# Patient Record
Sex: MALE | Race: WHITE | NOT HISPANIC OR LATINO | Employment: PART TIME | URBAN - METROPOLITAN AREA
[De-identification: names, ages, dates, MRNs, and addresses within clinical notes are randomized per-mention and may not be internally consistent; named-entity substitution may affect disease eponyms.]

---

## 2023-11-21 PROBLEM — F84.0 AUTISTIC DISORDER, ACTIVE (HHS-HCC): Status: ACTIVE | Noted: 2023-11-21

## 2023-11-21 PROBLEM — G93.40 ENCEPHALOPATHY: Status: ACTIVE | Noted: 2023-11-21

## 2023-11-21 PROBLEM — F90.2 ADHD (ATTENTION DEFICIT HYPERACTIVITY DISORDER), COMBINED TYPE: Status: ACTIVE | Noted: 2023-11-21

## 2023-11-21 PROBLEM — F41.9 ANXIETY: Status: ACTIVE | Noted: 2023-11-21

## 2023-11-21 PROBLEM — F63.9 IMPULSE DISORDER: Status: ACTIVE | Noted: 2023-11-21

## 2023-11-21 RX ORDER — DEXTROAMPHETAMINE SACCHARATE, AMPHETAMINE ASPARTATE MONOHYDRATE, DEXTROAMPHETAMINE SULFATE AND AMPHETAMINE SULFATE 7.5; 7.5; 7.5; 7.5 MG/1; MG/1; MG/1; MG/1
CAPSULE, EXTENDED RELEASE ORAL
COMMUNITY
Start: 2019-09-27 | End: 2024-03-04 | Stop reason: SDUPTHER

## 2023-11-21 RX ORDER — FLUOXETINE HYDROCHLORIDE 20 MG/1
1 CAPSULE ORAL DAILY
COMMUNITY
Start: 2018-03-26 | End: 2023-12-11 | Stop reason: SDUPTHER

## 2023-11-21 RX ORDER — FLUOXETINE 10 MG/1
1 CAPSULE ORAL DAILY
COMMUNITY
Start: 2017-12-07 | End: 2023-12-11 | Stop reason: SDUPTHER

## 2023-11-21 RX ORDER — RISPERIDONE 1 MG/1
1 TABLET ORAL 2 TIMES DAILY
COMMUNITY
Start: 2017-06-22 | End: 2023-12-11 | Stop reason: SDUPTHER

## 2023-12-11 ENCOUNTER — OFFICE VISIT (OUTPATIENT)
Dept: NEUROLOGY | Facility: CLINIC | Age: 23
End: 2023-12-11
Payer: MEDICAID

## 2023-12-11 VITALS
HEIGHT: 67 IN | DIASTOLIC BLOOD PRESSURE: 68 MMHG | WEIGHT: 225 LBS | BODY MASS INDEX: 35.31 KG/M2 | HEART RATE: 104 BPM | SYSTOLIC BLOOD PRESSURE: 110 MMHG

## 2023-12-11 DIAGNOSIS — F41.9 ANXIETY: ICD-10-CM

## 2023-12-11 DIAGNOSIS — F84.0 AUTISM SPECTRUM DISORDER (HHS-HCC): ICD-10-CM

## 2023-12-11 DIAGNOSIS — F84.0 AUTISTIC DISORDER, ACTIVE (HHS-HCC): ICD-10-CM

## 2023-12-11 DIAGNOSIS — F90.2 ATTENTION DEFICIT HYPERACTIVITY DISORDER (ADHD), COMBINED TYPE: ICD-10-CM

## 2023-12-11 DIAGNOSIS — F63.9 IMPULSE DISORDER: ICD-10-CM

## 2023-12-11 DIAGNOSIS — G93.40 ENCEPHALOPATHY: Primary | ICD-10-CM

## 2023-12-11 PROCEDURE — 99214 OFFICE O/P EST MOD 30 MIN: CPT | Performed by: PSYCHIATRY & NEUROLOGY

## 2023-12-11 PROCEDURE — 1036F TOBACCO NON-USER: CPT | Performed by: PSYCHIATRY & NEUROLOGY

## 2023-12-11 RX ORDER — RISPERIDONE 1 MG/1
1 TABLET ORAL 2 TIMES DAILY
Qty: 60 TABLET | Refills: 3 | Status: SHIPPED | OUTPATIENT
Start: 2023-12-11 | End: 2024-05-29 | Stop reason: SDUPTHER

## 2023-12-11 RX ORDER — DEXTROAMPHETAMINE SACCHARATE, AMPHETAMINE ASPARTATE MONOHYDRATE, DEXTROAMPHETAMINE SULFATE AND AMPHETAMINE SULFATE 7.5; 7.5; 7.5; 7.5 MG/1; MG/1; MG/1; MG/1
30 CAPSULE, EXTENDED RELEASE ORAL EVERY MORNING
Qty: 30 CAPSULE | Refills: 0 | Status: SHIPPED | OUTPATIENT
Start: 2024-01-10 | End: 2024-03-04 | Stop reason: SDUPTHER

## 2023-12-11 RX ORDER — DEXTROAMPHETAMINE SACCHARATE, AMPHETAMINE ASPARTATE, DEXTROAMPHETAMINE SULFATE AND AMPHETAMINE SULFATE 7.5; 7.5; 7.5; 7.5 MG/1; MG/1; MG/1; MG/1
TABLET ORAL
Qty: 30 TABLET | Refills: 0 | Status: SHIPPED | OUTPATIENT
Start: 2023-12-11 | End: 2024-05-29 | Stop reason: ALTCHOICE

## 2023-12-11 RX ORDER — DEXTROAMPHETAMINE SACCHARATE, AMPHETAMINE ASPARTATE MONOHYDRATE, DEXTROAMPHETAMINE SULFATE AND AMPHETAMINE SULFATE 7.5; 7.5; 7.5; 7.5 MG/1; MG/1; MG/1; MG/1
30 CAPSULE, EXTENDED RELEASE ORAL EVERY MORNING
Qty: 30 CAPSULE | Refills: 0 | Status: SHIPPED | OUTPATIENT
Start: 2024-02-09 | End: 2024-03-04 | Stop reason: SDUPTHER

## 2023-12-11 RX ORDER — GUANFACINE 2 MG/1
2 TABLET, EXTENDED RELEASE ORAL NIGHTLY
Qty: 30 TABLET | Refills: 3 | Status: SHIPPED | OUTPATIENT
Start: 2023-12-11 | End: 2024-05-29 | Stop reason: ALTCHOICE

## 2023-12-11 RX ORDER — DEXTROAMPHETAMINE SACCHARATE, AMPHETAMINE ASPARTATE, DEXTROAMPHETAMINE SULFATE AND AMPHETAMINE SULFATE 7.5; 7.5; 7.5; 7.5 MG/1; MG/1; MG/1; MG/1
TABLET ORAL
COMMUNITY
Start: 2023-02-14 | End: 2023-12-11 | Stop reason: SDUPTHER

## 2023-12-11 RX ORDER — GUANFACINE 2 MG/1
2 TABLET, EXTENDED RELEASE ORAL NIGHTLY
COMMUNITY
Start: 2023-11-21 | End: 2023-12-11 | Stop reason: SDUPTHER

## 2023-12-11 RX ORDER — FLUOXETINE HYDROCHLORIDE 20 MG/1
20 CAPSULE ORAL DAILY
Qty: 30 CAPSULE | Refills: 3 | Status: SHIPPED | OUTPATIENT
Start: 2023-12-11 | End: 2024-03-04 | Stop reason: SDUPTHER

## 2023-12-11 RX ORDER — FLUOXETINE 10 MG/1
10 CAPSULE ORAL DAILY
Qty: 30 CAPSULE | Refills: 3 | Status: SHIPPED | OUTPATIENT
Start: 2023-12-11 | End: 2024-03-04 | Stop reason: SDUPTHER

## 2023-12-11 ASSESSMENT — ENCOUNTER SYMPTOMS
SEIZURES: 0
FEVER: 0
NECK PAIN: 0
NECK STIFFNESS: 0
UNEXPECTED WEIGHT CHANGE: 0
BACK PAIN: 0
WHEEZING: 0
FREQUENCY: 0
TROUBLE SWALLOWING: 0
COUGH: 0
ADENOPATHY: 0
NUMBNESS: 0
JOINT SWELLING: 0
FATIGUE: 0
DIZZINESS: 0
SPEECH DIFFICULTY: 0
HEADACHES: 0
NAUSEA: 0
ARTHRALGIAS: 0
LIGHT-HEADEDNESS: 0
TREMORS: 0
HALLUCINATIONS: 0
SHORTNESS OF BREATH: 0
ABDOMINAL PAIN: 0
SINUS PRESSURE: 0
FACIAL ASYMMETRY: 0
EYE PAIN: 0
SLEEP DISTURBANCE: 0
BRUISES/BLEEDS EASILY: 0
DIFFICULTY URINATING: 0
HYPERACTIVE: 0
AGITATION: 0
CONFUSION: 0
VOMITING: 0
WEAKNESS: 0
PALPITATIONS: 0
PHOTOPHOBIA: 0

## 2023-12-11 ASSESSMENT — PATIENT HEALTH QUESTIONNAIRE - PHQ9
2. FEELING DOWN, DEPRESSED OR HOPELESS: NOT AT ALL
1. LITTLE INTEREST OR PLEASURE IN DOING THINGS: NOT AT ALL
SUM OF ALL RESPONSES TO PHQ9 QUESTIONS 1 & 2: 0

## 2023-12-11 NOTE — PROGRESS NOTES
Edwin Soliman  23 y.o.       SUBJECTIVE    HPI   Rene Soliman 23-year-old young man who was seen today for follow-up of his encephalopathy due to attention deficit disorder with autism spectrum disorder and impulsive behavior presents Fred is doing better but he still is having.  As of pacing which I believe is a part of his autism spectrum disorder.  I have discussed the frequency of the medication behavior modification and if needed we do see a psychologist and see him back in 3 months I have discussed the controlled substance policy, abuse potential, risk benefit and the precautions to be taken which she understood.      Due to technical limitations of voice recognition and human error, this note may not accurately reflect the care of the patient.    Review of Systems   Constitutional:  Negative for fatigue, fever and unexpected weight change.   HENT:  Negative for dental problem, ear pain, hearing loss, sinus pressure, tinnitus and trouble swallowing.    Eyes:  Negative for photophobia, pain and visual disturbance.   Respiratory:  Negative for cough, shortness of breath and wheezing.    Cardiovascular:  Negative for chest pain, palpitations and leg swelling.   Gastrointestinal:  Negative for abdominal pain, nausea and vomiting.   Genitourinary:  Negative for difficulty urinating, enuresis and frequency.   Musculoskeletal:  Negative for arthralgias, back pain, joint swelling, neck pain and neck stiffness.   Skin:  Negative for pallor and rash.   Allergic/Immunologic: Negative for food allergies.   Neurological:  Negative for dizziness, tremors, seizures, syncope, facial asymmetry, speech difficulty, weakness, light-headedness, numbness and headaches.   Hematological:  Negative for adenopathy. Does not bruise/bleed easily.   Psychiatric/Behavioral:  Negative for agitation, behavioral problems, confusion, hallucinations and sleep disturbance. The patient is not hyperactive.         Patient Active Problem  "List   Diagnosis    ADHD (attention deficit hyperactivity disorder), combined type    Anxiety    Autistic disorder, active    Encephalopathy    Impulse disorder     Past Medical History:   Diagnosis Date    Personal history of other mental and behavioral disorders     History of attention deficit hyperactivity disorder (ADHD)     Past Surgical History:   Procedure Laterality Date    CARDIAC SURGERY  04/20/2017    Heart Surgery       reports that he has never smoked. He has never used smokeless tobacco.    /68 (BP Location: Left arm, Patient Position: Sitting)   Pulse 104   Ht 1.708 m (5' 7.25\")   Wt 102 kg (225 lb)   BMI 34.98 kg/m²     OBJECTIVE  Physical Exam/Neurological Exam   Physical Exam  Constitutional: General appearance: no acute distress cognitive delayed and at times inappropriate and slow to respond  Auscultation of Heart: Regular rate and rhythm, no murmurs, normal S1 and S2.   Carotid Arteries: Intact without any bruits.   Neck is supple.   No lymph adenopathy.   Peripheral Vascular Exam: Pulses +2 and equal in all extremities. No swelling, varicosities, edema or tenderness to palpations.   Abdomen is soft, nondistended. No organomegaly.  Mental status: The patient was in no distress, alert, interactive and cooperative. Affect is inappropriate patient is cognitively delayed.  Orientation: oriented to person, oriented to place and oriented to time.   Memory: recent memory intact and remote memory intact.   Attention: normal attention span and normal concentrating ability.   Language: normal comprehension and no difficulty naming common objects.   Fund of knowledge: Patient displays inadequate knowledge of current events, inadequate fund of knowledge regarding past history and inadequate fund of knowledge regarding vocabulary.   Eyes: The ophthalmoscopic examination was normal. The fundi are visualized with normal disc margins and without.  Cranial nerve II: Visual fields full to " confrontation.   Cranial nerves III, IV, and VI: Pupils round, equally reactive to light; no ptosis. EOMs intact. No nystagmus.   Cranial Nerve V: Facial sensation intact bilaterally.   Cranial nerve VII: Normal and symmetric facial strength.   Cranial nerve VIII: Hearing is intact bilaterally to finger rub / whisper.   Cranial nerves IX and X: Palate elevates symmetrically.   Cranial nerve XI: Shoulder shrug and neck rotation strength are intact.   Cranial nerve XII: Tongue midline with normal strength.   Motor: Motor exam was normal. Muscle bulk was normal in both upper and lower extremities. Muscle tone was normal in both upper and lower extremities. Muscle strength was 5/5 throughout. no abnormal or adventitious movements were present.   Deep Tendon Reflexes: left biceps 2+ , right biceps 2+, left triceps 2+, right triceps 2+, left brachioradialis 2+, right brachioradialis 2+, left patella 2+, right patella 2+, left ankle jerk 2+, right ankle jerk 2+   Plantar Reflex: Toes downgoing to plantar stimulation on the left. Toes downgoing to plantar stimulation on the right.   Sensory Exam: Normal to light touch.   Coordination: There is no limb dystaxia and rapid alternating movements are intact.  Gait: Gait is normal without spasticity, ataxia or bradykinesia. Stance is stable with a negative Romberg      ASSESSMENT/PLAN  Diagnoses and all orders for this visit:  Encephalopathy  Attention deficit hyperactivity disorder (ADHD), combined type  -     amphetamine-dextroamphetamine (Adderall) 30 mg tablet; TAKE 1/2 BY MOUTH TWICE A DAY  -     amphetamine-dextroamphetamine XR (Adderall XR) 30 mg 24 hr capsule; Take 1 capsule (30 mg) by mouth once daily in the morning. Do not crush or chew. Do not start before January 10, 2024.  -     amphetamine-dextroamphetamine XR (Adderall XR) 30 mg 24 hr capsule; Take 1 capsule (30 mg) by mouth once daily in the morning. Do not crush or chew. Do not start before February 9, 2024.  -      guanFACINE (Intuniv) 2 mg 24 hr tablet; Take 1 tablet (2 mg) by mouth once daily at bedtime.  Anxiety  -     FLUoxetine (PROzac) 10 mg capsule; Take 1 capsule (10 mg) by mouth once daily.  -     FLUoxetine (PROzac) 20 mg capsule; Take 1 capsule (20 mg) by mouth once daily.  Autism spectrum disorder  -     risperiDONE (RisperDAL) 1 mg tablet; Take 1 tablet (1 mg) by mouth 2 times a day.  Autistic disorder, active  Impulse disorder        Markel Littlejohn MD  12/11/2023  12:40 PM

## 2024-03-04 ENCOUNTER — OFFICE VISIT (OUTPATIENT)
Dept: NEUROLOGY | Facility: CLINIC | Age: 24
End: 2024-03-04
Payer: MEDICAID

## 2024-03-04 VITALS
HEIGHT: 69 IN | DIASTOLIC BLOOD PRESSURE: 74 MMHG | HEART RATE: 107 BPM | SYSTOLIC BLOOD PRESSURE: 109 MMHG | WEIGHT: 234.8 LBS | BODY MASS INDEX: 34.78 KG/M2

## 2024-03-04 DIAGNOSIS — F90.2 ATTENTION DEFICIT HYPERACTIVITY DISORDER (ADHD), COMBINED TYPE: ICD-10-CM

## 2024-03-04 DIAGNOSIS — F41.9 ANXIETY: ICD-10-CM

## 2024-03-04 DIAGNOSIS — G93.40 ENCEPHALOPATHY: Primary | ICD-10-CM

## 2024-03-04 PROCEDURE — 1036F TOBACCO NON-USER: CPT | Performed by: PSYCHIATRY & NEUROLOGY

## 2024-03-04 PROCEDURE — 99214 OFFICE O/P EST MOD 30 MIN: CPT | Performed by: PSYCHIATRY & NEUROLOGY

## 2024-03-04 RX ORDER — FLUOXETINE HYDROCHLORIDE 20 MG/1
20 CAPSULE ORAL DAILY
Qty: 90 CAPSULE | Refills: 1 | Status: CANCELLED | OUTPATIENT
Start: 2024-03-04

## 2024-03-04 RX ORDER — DEXTROAMPHETAMINE SACCHARATE, AMPHETAMINE ASPARTATE MONOHYDRATE, DEXTROAMPHETAMINE SULFATE AND AMPHETAMINE SULFATE 7.5; 7.5; 7.5; 7.5 MG/1; MG/1; MG/1; MG/1
30 CAPSULE, EXTENDED RELEASE ORAL EVERY MORNING
Qty: 30 CAPSULE | Refills: 0 | Status: SHIPPED | OUTPATIENT
Start: 2024-05-03 | End: 2024-05-29 | Stop reason: ALTCHOICE

## 2024-03-04 RX ORDER — FLUOXETINE 10 MG/1
10 CAPSULE ORAL DAILY
Qty: 30 CAPSULE | Refills: 3 | Status: SHIPPED | OUTPATIENT
Start: 2024-03-04 | End: 2024-05-29 | Stop reason: SDUPTHER

## 2024-03-04 RX ORDER — DEXTROAMPHETAMINE SACCHARATE, AMPHETAMINE ASPARTATE MONOHYDRATE, DEXTROAMPHETAMINE SULFATE AND AMPHETAMINE SULFATE 7.5; 7.5; 7.5; 7.5 MG/1; MG/1; MG/1; MG/1
30 CAPSULE, EXTENDED RELEASE ORAL EVERY MORNING
Qty: 30 CAPSULE | Refills: 0 | Status: SHIPPED | OUTPATIENT
Start: 2024-04-03 | End: 2024-05-29 | Stop reason: SDUPTHER

## 2024-03-04 RX ORDER — SERTRALINE HYDROCHLORIDE 50 MG/1
TABLET, FILM COATED ORAL
COMMUNITY
Start: 2010-02-23 | End: 2024-05-29 | Stop reason: ALTCHOICE

## 2024-03-04 RX ORDER — LISDEXAMFETAMINE DIMESYLATE 70 MG/1
CAPSULE ORAL
COMMUNITY
Start: 2012-01-31 | End: 2024-03-04 | Stop reason: ALTCHOICE

## 2024-03-04 RX ORDER — FLUOXETINE HYDROCHLORIDE 20 MG/1
20 CAPSULE ORAL DAILY
Qty: 30 CAPSULE | Refills: 3 | Status: SHIPPED | OUTPATIENT
Start: 2024-03-04 | End: 2024-05-29 | Stop reason: SDUPTHER

## 2024-03-04 RX ORDER — DEXTROAMPHETAMINE SACCHARATE, AMPHETAMINE ASPARTATE MONOHYDRATE, DEXTROAMPHETAMINE SULFATE AND AMPHETAMINE SULFATE 7.5; 7.5; 7.5; 7.5 MG/1; MG/1; MG/1; MG/1
30 CAPSULE, EXTENDED RELEASE ORAL DAILY
Qty: 30 CAPSULE | Refills: 0 | Status: SHIPPED | OUTPATIENT
Start: 2024-03-04 | End: 2024-05-29 | Stop reason: SDUPTHER

## 2024-03-04 ASSESSMENT — ENCOUNTER SYMPTOMS
HYPERACTIVE: 0
LIGHT-HEADEDNESS: 0
EYE PAIN: 0
AGITATION: 0
WEAKNESS: 0
HALLUCINATIONS: 0
TREMORS: 0
NUMBNESS: 0
FREQUENCY: 0
FACIAL ASYMMETRY: 0
JOINT SWELLING: 0
SPEECH DIFFICULTY: 0
FEVER: 0
DIZZINESS: 0
SHORTNESS OF BREATH: 0
TROUBLE SWALLOWING: 0
BRUISES/BLEEDS EASILY: 0
HEADACHES: 0
WHEEZING: 0
UNEXPECTED WEIGHT CHANGE: 0
SINUS PRESSURE: 0
NECK PAIN: 0
ADENOPATHY: 0
BACK PAIN: 0
VOMITING: 0
PALPITATIONS: 0
ABDOMINAL PAIN: 0
DIFFICULTY URINATING: 0
CONFUSION: 0
NAUSEA: 0
ARTHRALGIAS: 0
FATIGUE: 0
COUGH: 0
SEIZURES: 0
PHOTOPHOBIA: 0
NECK STIFFNESS: 0
SLEEP DISTURBANCE: 0

## 2024-03-04 ASSESSMENT — PATIENT HEALTH QUESTIONNAIRE - PHQ9
SUM OF ALL RESPONSES TO PHQ9 QUESTIONS 1 & 2: 0
2. FEELING DOWN, DEPRESSED OR HOPELESS: NOT AT ALL
1. LITTLE INTEREST OR PLEASURE IN DOING THINGS: NOT AT ALL

## 2024-03-04 NOTE — PROGRESS NOTES
Edwin Soliman  23 y.o.       SUBJECTIVE    HPI   Edwin 43-year-old young man who was seen today for follow-up of his encephalopathy due to autism spectrum disorder decreased attention span anxiety and impulsive behavior.  Since last seen he has done very well on Adderall XR 30 in the morning Prozac 30 mg daily and risperidone.  He has been off all other medication.  Today his physical and neurological examination was normal.  I would like to continue all his medications the way he is taking and have discussed the controlled substance policy, abusive potential, risk benefit and the precautions to be taken and depending on how he does I might make future recommendation when he comes back to see me in 3 months    I did review the medication list.    I have discussed the controlled substance policy and that he needs to be followed by the PCP and doing yearly lipid profile as well as blood sugar since he is on risperidone and few other antidepressant medication    I did review the medication list.      Due to technical limitations of voice recognition and human error, this note may not accurately reflect the care of the patient.    Review of Systems   Constitutional:  Negative for fatigue, fever and unexpected weight change.   HENT:  Negative for dental problem, ear pain, hearing loss, sinus pressure, tinnitus and trouble swallowing.    Eyes:  Negative for photophobia, pain and visual disturbance.   Respiratory:  Negative for cough, shortness of breath and wheezing.    Cardiovascular:  Negative for chest pain, palpitations and leg swelling.   Gastrointestinal:  Negative for abdominal pain, nausea and vomiting.   Genitourinary:  Negative for difficulty urinating, enuresis and frequency.   Musculoskeletal:  Negative for arthralgias, back pain, joint swelling, neck pain and neck stiffness.   Skin:  Negative for pallor and rash.   Allergic/Immunologic: Negative for food allergies.   Neurological:  Negative for  "dizziness, tremors, seizures, syncope, facial asymmetry, speech difficulty, weakness, light-headedness, numbness and headaches.   Hematological:  Negative for adenopathy. Does not bruise/bleed easily.   Psychiatric/Behavioral:  Negative for agitation, behavioral problems, confusion, hallucinations and sleep disturbance. The patient is not hyperactive.         Patient Active Problem List   Diagnosis    ADHD (attention deficit hyperactivity disorder), combined type    Anxiety    Autistic disorder, active    Encephalopathy    Impulse disorder    PDA (patent ductus arteriosus)    Attention deficit hyperactivity disorder (ADHD)     Past Medical History:   Diagnosis Date    Personal history of other mental and behavioral disorders     History of attention deficit hyperactivity disorder (ADHD)     Past Surgical History:   Procedure Laterality Date    CARDIAC SURGERY  04/20/2017    Heart Surgery       reports that he has never smoked. He has never used smokeless tobacco.    /74   Pulse 107   Ht 1.759 m (5' 9.25\")   Wt 107 kg (234 lb 12.8 oz)   BMI 34.42 kg/m²     OBJECTIVE  Physical Exam/Neurological Exam   Physical Exam  Constitutional: General appearance: no acute distress cognitive delayed and at times inappropriate and slow to respond  Auscultation of Heart: Regular rate and rhythm, no murmurs, normal S1 and S2.   Carotid Arteries: Intact without any bruits.   Neck is supple.   No lymph adenopathy.   Peripheral Vascular Exam: Pulses +2 and equal in all extremities. No swelling, varicosities, edema or tenderness to palpations.   Abdomen is soft, nondistended. No organomegaly.  Mental status: The patient was in no distress, alert, interactive and cooperative. Affect is inappropriate patient is cognitively delayed.  Orientation: oriented to person, oriented to place and oriented to time.   Memory: recent memory intact and remote memory intact.   Attention: normal attention span and normal concentrating ability. "   Language: normal comprehension and no difficulty naming common objects.   Fund of knowledge: Patient displays inadequate knowledge of current events, inadequate fund of knowledge regarding past history and inadequate fund of knowledge regarding vocabulary.   Eyes: The ophthalmoscopic examination was normal. The fundi are visualized with normal disc margins and without.  Cranial nerve II: Visual fields full to confrontation.   Cranial nerves III, IV, and VI: Pupils round, equally reactive to light; no ptosis. EOMs intact. No nystagmus.   Cranial Nerve V: Facial sensation intact bilaterally.   Cranial nerve VII: Normal and symmetric facial strength.   Cranial nerve VIII: Hearing is intact bilaterally to finger rub / whisper.   Cranial nerves IX and X: Palate elevates symmetrically.   Cranial nerve XI: Shoulder shrug and neck rotation strength are intact.   Cranial nerve XII: Tongue midline with normal strength.   Motor: Motor exam was normal. Muscle bulk was normal in both upper and lower extremities. Muscle tone was normal in both upper and lower extremities. Muscle strength was 5/5 throughout. no abnormal or adventitious movements were present.   Deep Tendon Reflexes: left biceps 2+ , right biceps 2+, left triceps 2+, right triceps 2+, left brachioradialis 2+, right brachioradialis 2+, left patella 2+, right patella 2+, left ankle jerk 2+, right ankle jerk 2+   Plantar Reflex: Toes downgoing to plantar stimulation on the left. Toes downgoing to plantar stimulation on the right.   Sensory Exam: Normal to light touch.   Coordination: There is no limb dystaxia and rapid alternating movements are intact.  Gait: Gait is normal without spasticity, dionte      ASSESSMENT/PLAN  Diagnoses and all orders for this visit:  Encephalopathy  Attention deficit hyperactivity disorder (ADHD), combined type  -     amphetamine-dextroamphetamine XR (Adderall XR) 30 mg 24 hr capsule; Take 1 capsule (30 mg) by mouth once daily.  -      amphetamine-dextroamphetamine XR (Adderall XR) 30 mg 24 hr capsule; Take 1 capsule (30 mg) by mouth once daily in the morning. Do not crush or chew. Do not start before April 3, 2024.  -     amphetamine-dextroamphetamine XR (Adderall XR) 30 mg 24 hr capsule; Take 1 capsule (30 mg) by mouth once daily in the morning. Do not crush or chew. Do not start before May 3, 2024.  Anxiety  -     FLUoxetine (PROzac) 20 mg capsule; Take 1 capsule (20 mg) by mouth once daily.  -     FLUoxetine (PROzac) 10 mg capsule; Take 1 capsule (10 mg) by mouth once daily.        Markel Littlejohn MD  3/4/2024  11:31 AM

## 2024-03-06 DIAGNOSIS — F41.9 ANXIETY: ICD-10-CM

## 2024-03-06 RX ORDER — FLUOXETINE HYDROCHLORIDE 20 MG/1
20 CAPSULE ORAL DAILY
Qty: 90 CAPSULE | Refills: 1 | Status: CANCELLED | OUTPATIENT
Start: 2024-03-06

## 2024-05-29 ENCOUNTER — OFFICE VISIT (OUTPATIENT)
Dept: NEUROLOGY | Facility: CLINIC | Age: 24
End: 2024-05-29
Payer: MEDICAID

## 2024-05-29 VITALS
HEART RATE: 77 BPM | HEIGHT: 69 IN | BODY MASS INDEX: 33.33 KG/M2 | WEIGHT: 225 LBS | DIASTOLIC BLOOD PRESSURE: 79 MMHG | SYSTOLIC BLOOD PRESSURE: 113 MMHG

## 2024-05-29 DIAGNOSIS — F41.9 ANXIETY: ICD-10-CM

## 2024-05-29 DIAGNOSIS — G93.40 ENCEPHALOPATHY: Primary | ICD-10-CM

## 2024-05-29 DIAGNOSIS — F63.9 IMPULSE DISORDER: ICD-10-CM

## 2024-05-29 DIAGNOSIS — F90.2 ATTENTION DEFICIT HYPERACTIVITY DISORDER (ADHD), COMBINED TYPE: ICD-10-CM

## 2024-05-29 DIAGNOSIS — F84.0 AUTISTIC DISORDER, ACTIVE (HHS-HCC): ICD-10-CM

## 2024-05-29 DIAGNOSIS — F84.0 AUTISM SPECTRUM DISORDER (HHS-HCC): ICD-10-CM

## 2024-05-29 DIAGNOSIS — E88.810 METABOLIC SYNDROME: ICD-10-CM

## 2024-05-29 PROCEDURE — 99214 OFFICE O/P EST MOD 30 MIN: CPT | Performed by: PSYCHIATRY & NEUROLOGY

## 2024-05-29 PROCEDURE — 1036F TOBACCO NON-USER: CPT | Performed by: PSYCHIATRY & NEUROLOGY

## 2024-05-29 RX ORDER — DEXTROAMPHETAMINE SACCHARATE, AMPHETAMINE ASPARTATE MONOHYDRATE, DEXTROAMPHETAMINE SULFATE AND AMPHETAMINE SULFATE 7.5; 7.5; 7.5; 7.5 MG/1; MG/1; MG/1; MG/1
30 CAPSULE, EXTENDED RELEASE ORAL EVERY MORNING
Qty: 30 CAPSULE | Refills: 0 | Status: SHIPPED | OUTPATIENT
Start: 2024-07-28 | End: 2024-08-27

## 2024-05-29 RX ORDER — FLUOXETINE 10 MG/1
10 CAPSULE ORAL DAILY
Qty: 30 CAPSULE | Refills: 3 | Status: SHIPPED | OUTPATIENT
Start: 2024-05-29

## 2024-05-29 RX ORDER — FLUOXETINE HYDROCHLORIDE 20 MG/1
20 CAPSULE ORAL DAILY
Qty: 30 CAPSULE | Refills: 3 | Status: SHIPPED | OUTPATIENT
Start: 2024-05-29

## 2024-05-29 RX ORDER — DEXTROAMPHETAMINE SACCHARATE, AMPHETAMINE ASPARTATE MONOHYDRATE, DEXTROAMPHETAMINE SULFATE AND AMPHETAMINE SULFATE 7.5; 7.5; 7.5; 7.5 MG/1; MG/1; MG/1; MG/1
30 CAPSULE, EXTENDED RELEASE ORAL DAILY
Qty: 30 CAPSULE | Refills: 0 | Status: SHIPPED | OUTPATIENT
Start: 2024-06-28 | End: 2024-07-28

## 2024-05-29 RX ORDER — DEXTROAMPHETAMINE SACCHARATE, AMPHETAMINE ASPARTATE MONOHYDRATE, DEXTROAMPHETAMINE SULFATE AND AMPHETAMINE SULFATE 7.5; 7.5; 7.5; 7.5 MG/1; MG/1; MG/1; MG/1
30 CAPSULE, EXTENDED RELEASE ORAL EVERY MORNING
Qty: 30 CAPSULE | Refills: 0 | Status: SHIPPED | OUTPATIENT
Start: 2024-05-29 | End: 2024-06-28

## 2024-05-29 RX ORDER — RISPERIDONE 1 MG/1
1 TABLET ORAL 2 TIMES DAILY
Qty: 60 TABLET | Refills: 3 | Status: SHIPPED | OUTPATIENT
Start: 2024-05-29 | End: 2024-05-29

## 2024-05-29 ASSESSMENT — ENCOUNTER SYMPTOMS
HALLUCINATIONS: 0
ADENOPATHY: 0
NAUSEA: 0
AGITATION: 0
HYPERACTIVE: 0
NERVOUS/ANXIOUS: 1
JOINT SWELLING: 0
FATIGUE: 0
NECK PAIN: 0
UNEXPECTED WEIGHT CHANGE: 0
VOMITING: 0
SPEECH DIFFICULTY: 0
TREMORS: 0
DIZZINESS: 0
COUGH: 0
SEIZURES: 0
SINUS PRESSURE: 0
TROUBLE SWALLOWING: 0
DIFFICULTY URINATING: 0
DECREASED CONCENTRATION: 1
SLEEP DISTURBANCE: 0
FREQUENCY: 0
BACK PAIN: 0
FACIAL ASYMMETRY: 0
ARTHRALGIAS: 0
SHORTNESS OF BREATH: 0
PALPITATIONS: 0
PHOTOPHOBIA: 0
NUMBNESS: 0
ABDOMINAL PAIN: 0
HEADACHES: 0
LIGHT-HEADEDNESS: 0
EYE PAIN: 0
WEAKNESS: 0
NECK STIFFNESS: 0
WHEEZING: 0
FEVER: 0
CONFUSION: 0
BRUISES/BLEEDS EASILY: 0

## 2024-05-29 ASSESSMENT — PATIENT HEALTH QUESTIONNAIRE - PHQ9
1. LITTLE INTEREST OR PLEASURE IN DOING THINGS: NOT AT ALL
SUM OF ALL RESPONSES TO PHQ9 QUESTIONS 1 & 2: 0
2. FEELING DOWN, DEPRESSED OR HOPELESS: NOT AT ALL

## 2024-05-29 NOTE — PROGRESS NOTES
Edwin Soliman  23 y.o.       SUBJECTIVE    HPI   Edwin is a 23-year-old young boy who was seen today for follow-up of his encephalopathy due to attention deficit disorder with autism spectrum disorder and impulsive behavior with anxiety.  Since last  Doing very well on all his medication but mom would like to take him off some of the medication.  No side effects of the medication.  His physical and neurological is unchanged.  Like to take him off his guanfacine and check him for hemoglobin A1c lipid profile and CBC and see him back in 3 months    I did review the medication list risk benefit and the precautions to be taken while on this medication and depending on how he does I might take him off of risperidone at some point    He is attending workshop and doing very well    I have discussed the controlled substance policy, abusive potential, risk benefit and the precautions to be taken which they understood.  Due to technical limitations of voice recognition and human error, this note may not accurately reflect the care of the patient.    Review of Systems   Constitutional:  Negative for fatigue, fever and unexpected weight change.   HENT:  Negative for dental problem, ear pain, hearing loss, sinus pressure, tinnitus and trouble swallowing.    Eyes:  Negative for photophobia, pain and visual disturbance.   Respiratory:  Negative for cough, shortness of breath and wheezing.    Cardiovascular:  Negative for chest pain, palpitations and leg swelling.   Gastrointestinal:  Negative for abdominal pain, nausea and vomiting.   Genitourinary:  Negative for difficulty urinating, enuresis and frequency.   Musculoskeletal:  Negative for arthralgias, back pain, joint swelling, neck pain and neck stiffness.   Skin:  Negative for pallor and rash.   Allergic/Immunologic: Negative for food allergies.   Neurological:  Negative for dizziness, tremors, seizures, syncope, facial asymmetry, speech difficulty, weakness,  "light-headedness, numbness and headaches.   Hematological:  Negative for adenopathy. Does not bruise/bleed easily.   Psychiatric/Behavioral:  Positive for decreased concentration. Negative for agitation, behavioral problems, confusion, hallucinations and sleep disturbance. The patient is nervous/anxious. The patient is not hyperactive.         Patient Active Problem List   Diagnosis    ADHD (attention deficit hyperactivity disorder), combined type    Anxiety    Autistic disorder, active ([facility])    Encephalopathy    Impulse disorder    PDA (patent ductus arteriosus) ([facility])    Attention deficit hyperactivity disorder (ADHD)     Past Medical History:   Diagnosis Date    Personal history of other mental and behavioral disorders     History of attention deficit hyperactivity disorder (ADHD)     Past Surgical History:   Procedure Laterality Date    CARDIAC SURGERY  04/20/2017    Heart Surgery       reports that he has never smoked. He has never used smokeless tobacco. He reports that he does not drink alcohol and does not use drugs.    /79   Pulse 77   Ht 1.753 m (5' 9\")   Wt 102 kg (225 lb)   BMI 33.23 kg/m²     OBJECTIVE  Physical Exam/Neurological Exam   Constitutional: General appearance: no acute distress cognitive delayed and at times inappropriate and slow to respond  Auscultation of Heart: Regular rate and rhythm, no murmurs, normal S1 and S2.   Carotid Arteries: Intact without any bruits.   Neck is supple.   No lymph adenopathy.   Peripheral Vascular Exam: Pulses +2 and equal in all extremities. No swelling, varicosities, edema or tenderness to palpations.   Abdomen is soft, nondistended. No organomegaly.  Mental status: The patient was in no distress, alert, interactive and cooperative. Affect is inappropriate patient is cognitively delayed.  Orientation: oriented to person, oriented to place and oriented to time.   Memory: recent memory intact and remote memory intact.   Attention: normal " attention span and normal concentrating ability.   Language: normal comprehension and no difficulty naming common objects.   Fund of knowledge: Patient displays inadequate knowledge of current events, inadequate fund of knowledge regarding past history and inadequate fund of knowledge regarding vocabulary.   Eyes: The ophthalmoscopic examination was normal. The fundi are visualized with normal disc margins and without.  Cranial nerve II: Visual fields full to confrontation.   Cranial nerves III, IV, and VI: Pupils round, equally reactive to light; no ptosis. EOMs intact. No nystagmus.   Cranial Nerve V: Facial sensation intact bilaterally.   Cranial nerve VII: Normal and symmetric facial strength.   Cranial nerve VIII: Hearing is intact bilaterally to finger rub / whisper.   Cranial nerves IX and X: Palate elevates symmetrically.   Cranial nerve XI: Shoulder shrug and neck rotation strength are intact.   Cranial nerve XII: Tongue midline with normal strength.   Motor: Motor exam was normal. Muscle bulk was normal in both upper and lower extremities. Muscle tone was normal in both upper and lower extremities. Muscle strength was 5/5 throughout. no abnormal or adventitious movements were present.   Deep Tendon Reflexes: left biceps 2+ , right biceps 2+, left triceps 2+, right triceps 2+, left brachioradialis 2+, right brachioradialis 2+, left patella 2+, right patella 2+, left ankle jerk 2+, right ankle jerk 2+   Plantar Reflex: Toes downgoing to plantar stimulation on the left. Toes downgoing to plantar stimulation on the right.   Sensory Exam: Normal to light touch.   Coordination: There is no limb dystaxia and rapid alternating movements are intact.  Gait: Gait is normal without spasticity.  He was ambulating without any difficulty      ASSESSMENT/PLAN  Diagnoses and all orders for this visit:  Encephalopathy  Attention deficit hyperactivity disorder (ADHD), combined type  -     amphetamine-dextroamphetamine XR  (Adderall XR) 30 mg 24 hr capsule; Take 1 capsule (30 mg) by mouth once daily. Do not fill before June 28, 2024.  -     amphetamine-dextroamphetamine XR (Adderall XR) 30 mg 24 hr capsule; Take 1 capsule (30 mg) by mouth once daily in the morning. Do not crush or chew. Do not fill before July 28, 2024.  -     amphetamine-dextroamphetamine XR (Adderall XR) 30 mg 24 hr capsule; Take 1 capsule (30 mg) by mouth once daily in the morning. Do not crush or chew.  Anxiety  -     FLUoxetine (PROzac) 20 mg capsule; Take 1 capsule (20 mg) by mouth once daily.  -     FLUoxetine (PROzac) 10 mg capsule; Take 1 capsule (10 mg) by mouth once daily.  Autism spectrum disorder ([facility])  -     risperiDONE (RisperDAL) 1 mg tablet; Take 1 tablet (1 mg) by mouth 2 times a day.  Autistic disorder, active ([facility])  Impulse disorder        Markel Littlejohn MD  5/29/2024  3:07 PM

## 2024-06-27 ENCOUNTER — TELEPHONE (OUTPATIENT)
Dept: NEUROLOGY | Facility: CLINIC | Age: 24
End: 2024-06-27
Payer: MEDICAID

## 2024-06-27 DIAGNOSIS — F41.9 ANXIETY: ICD-10-CM

## 2024-06-28 RX ORDER — FLUOXETINE 10 MG/1
10 CAPSULE ORAL DAILY
Qty: 90 CAPSULE | Refills: 3 | Status: SHIPPED | OUTPATIENT
Start: 2024-06-28

## 2024-06-28 RX ORDER — FLUOXETINE HYDROCHLORIDE 20 MG/1
20 CAPSULE ORAL DAILY
Qty: 90 CAPSULE | Refills: 3 | Status: SHIPPED | OUTPATIENT
Start: 2024-06-28

## 2024-07-30 ENCOUNTER — APPOINTMENT (OUTPATIENT)
Dept: PRIMARY CARE | Facility: CLINIC | Age: 24
End: 2024-07-30
Payer: MEDICAID

## 2024-08-20 ENCOUNTER — TELEPHONE (OUTPATIENT)
Dept: NEUROLOGY | Facility: CLINIC | Age: 24
End: 2024-08-20
Payer: MEDICAID

## 2024-08-20 NOTE — TELEPHONE ENCOUNTER
Patient's mother called and wanted to let us know that [facility] does not have the medication in stock. I personally to the pharmacy. He does not have any medication left. Last dose was yesterday. The pharmacy stated they are on a nationwide backorder. Patient has appointment tomorrow 08/21  Please advise.

## 2024-08-21 ENCOUNTER — APPOINTMENT (OUTPATIENT)
Dept: NEUROLOGY | Facility: CLINIC | Age: 24
End: 2024-08-21
Payer: MEDICAID

## 2024-08-22 ENCOUNTER — TELEPHONE (OUTPATIENT)
Dept: NEUROLOGY | Facility: CLINIC | Age: 24
End: 2024-08-22
Payer: MEDICAID

## 2024-08-22 DIAGNOSIS — F41.9 ANXIETY: ICD-10-CM

## 2024-08-22 DIAGNOSIS — F84.0 AUTISM SPECTRUM DISORDER (HHS-HCC): ICD-10-CM

## 2024-08-22 DIAGNOSIS — F90.2 ATTENTION DEFICIT HYPERACTIVITY DISORDER (ADHD), COMBINED TYPE: ICD-10-CM

## 2024-08-22 RX ORDER — DEXTROAMPHETAMINE SACCHARATE, AMPHETAMINE ASPARTATE MONOHYDRATE, DEXTROAMPHETAMINE SULFATE AND AMPHETAMINE SULFATE 7.5; 7.5; 7.5; 7.5 MG/1; MG/1; MG/1; MG/1
30 CAPSULE, EXTENDED RELEASE ORAL EVERY MORNING
Qty: 30 CAPSULE | Refills: 0 | Status: SHIPPED | OUTPATIENT
Start: 2024-08-22 | End: 2024-09-21

## 2024-08-22 RX ORDER — RISPERIDONE 1 MG/1
1 TABLET ORAL 2 TIMES DAILY
Qty: 180 TABLET | Refills: 0 | Status: SHIPPED | OUTPATIENT
Start: 2024-08-22

## 2024-08-22 RX ORDER — FLUOXETINE 10 MG/1
10 CAPSULE ORAL DAILY
Qty: 90 CAPSULE | Refills: 3 | Status: SHIPPED | OUTPATIENT
Start: 2024-08-22

## 2024-08-22 RX ORDER — FLUOXETINE HYDROCHLORIDE 20 MG/1
20 CAPSULE ORAL DAILY
Qty: 90 CAPSULE | Refills: 3 | Status: SHIPPED | OUTPATIENT
Start: 2024-08-22

## 2024-08-22 NOTE — TELEPHONE ENCOUNTER
Pt had to afshan appt this week, is asking for a refill to be sent to [facility] in [place] because their other pharmacy was out.  Thanks.

## 2024-08-23 ENCOUNTER — APPOINTMENT (OUTPATIENT)
Dept: PRIMARY CARE | Facility: CLINIC | Age: 24
End: 2024-08-23
Payer: MEDICAID

## 2024-09-23 ENCOUNTER — TELEPHONE (OUTPATIENT)
Dept: NEUROSURGERY | Facility: CLINIC | Age: 24
End: 2024-09-23
Payer: MEDICAID

## 2024-09-23 DIAGNOSIS — F90.2 ATTENTION DEFICIT HYPERACTIVITY DISORDER (ADHD), COMBINED TYPE: ICD-10-CM

## 2024-09-23 RX ORDER — DEXTROAMPHETAMINE SACCHARATE, AMPHETAMINE ASPARTATE MONOHYDRATE, DEXTROAMPHETAMINE SULFATE AND AMPHETAMINE SULFATE 7.5; 7.5; 7.5; 7.5 MG/1; MG/1; MG/1; MG/1
30 CAPSULE, EXTENDED RELEASE ORAL DAILY
Qty: 30 CAPSULE | Refills: 0 | Status: SHIPPED | OUTPATIENT
Start: 2024-09-23 | End: 2024-10-23

## 2024-09-25 ENCOUNTER — TELEPHONE (OUTPATIENT)
Dept: NEUROLOGY | Facility: CLINIC | Age: 24
End: 2024-09-25
Payer: MEDICAID

## 2024-09-25 DIAGNOSIS — F90.0 ATTENTION DEFICIT HYPERACTIVITY DISORDER (ADHD), PREDOMINANTLY INATTENTIVE TYPE: Primary | ICD-10-CM

## 2024-09-25 PROBLEM — Z86.59 HISTORY OF MENTAL DISORDER: Status: ACTIVE | Noted: 2024-09-25

## 2024-09-25 PROBLEM — F63.9 IMPULSE CONTROL DISORDER: Status: ACTIVE | Noted: 2024-09-25

## 2024-09-25 PROBLEM — R45.87 IMPULSIVENESS: Status: ACTIVE | Noted: 2024-09-25

## 2024-09-25 PROBLEM — F90.2 ATTENTION DEFICIT HYPERACTIVITY DISORDER (ADHD), COMBINED TYPE: Status: ACTIVE | Noted: 2024-09-25

## 2024-09-25 PROBLEM — G93.9 DISORDER OF BRAIN: Status: ACTIVE | Noted: 2024-09-25

## 2024-09-25 RX ORDER — GUANFACINE 2 MG/1
TABLET, EXTENDED RELEASE ORAL
COMMUNITY
Start: 2023-03-23

## 2024-09-25 RX ORDER — DEXMETHYLPHENIDATE HYDROCHLORIDE 30 MG/1
30 CAPSULE, EXTENDED RELEASE ORAL DAILY
Qty: 30 CAPSULE | Refills: 0 | Status: SHIPPED | OUTPATIENT
Start: 2024-09-25 | End: 2024-10-25

## 2024-09-25 NOTE — TELEPHONE ENCOUNTER
Patient mom called stating she can't find Amphetamine-dextroamphetamine XR 30 mg. Patient's next appt is 9/30. Please advise.

## 2024-09-30 ENCOUNTER — APPOINTMENT (OUTPATIENT)
Dept: NEUROLOGY | Facility: CLINIC | Age: 24
End: 2024-09-30
Payer: MEDICAID

## 2024-09-30 VITALS
SYSTOLIC BLOOD PRESSURE: 113 MMHG | HEIGHT: 68 IN | BODY MASS INDEX: 39.56 KG/M2 | WEIGHT: 261 LBS | DIASTOLIC BLOOD PRESSURE: 80 MMHG | HEART RATE: 101 BPM

## 2024-09-30 DIAGNOSIS — F63.9 IMPULSE DISORDER: ICD-10-CM

## 2024-09-30 DIAGNOSIS — F84.0 AUTISTIC DISORDER, ACTIVE (HHS-HCC): ICD-10-CM

## 2024-09-30 DIAGNOSIS — F84.0 AUTISM SPECTRUM DISORDER (HHS-HCC): ICD-10-CM

## 2024-09-30 DIAGNOSIS — G93.40 ENCEPHALOPATHY: Primary | ICD-10-CM

## 2024-09-30 DIAGNOSIS — F41.9 ANXIETY: ICD-10-CM

## 2024-09-30 DIAGNOSIS — F90.2 ATTENTION DEFICIT HYPERACTIVITY DISORDER (ADHD), COMBINED TYPE: ICD-10-CM

## 2024-09-30 PROCEDURE — 3008F BODY MASS INDEX DOCD: CPT | Performed by: PSYCHIATRY & NEUROLOGY

## 2024-09-30 PROCEDURE — 1036F TOBACCO NON-USER: CPT | Performed by: PSYCHIATRY & NEUROLOGY

## 2024-09-30 PROCEDURE — 99214 OFFICE O/P EST MOD 30 MIN: CPT | Performed by: PSYCHIATRY & NEUROLOGY

## 2024-09-30 RX ORDER — RISPERIDONE 1 MG/1
1 TABLET ORAL 2 TIMES DAILY
Qty: 180 TABLET | Refills: 1 | Status: SHIPPED | OUTPATIENT
Start: 2024-09-30

## 2024-09-30 RX ORDER — DEXMETHYLPHENIDATE HYDROCHLORIDE 30 MG/1
30 CAPSULE, EXTENDED RELEASE ORAL DAILY
Qty: 30 CAPSULE | Refills: 0 | Status: SHIPPED | OUTPATIENT
Start: 2024-11-23 | End: 2024-12-23

## 2024-09-30 RX ORDER — DEXMETHYLPHENIDATE HYDROCHLORIDE 30 MG/1
30 CAPSULE, EXTENDED RELEASE ORAL DAILY
Qty: 30 CAPSULE | Refills: 0 | Status: SHIPPED | OUTPATIENT
Start: 2024-10-24 | End: 2024-11-23

## 2024-09-30 RX ORDER — FLUOXETINE HYDROCHLORIDE 40 MG/1
40 CAPSULE ORAL DAILY
Qty: 30 CAPSULE | Refills: 3 | Status: SHIPPED | OUTPATIENT
Start: 2024-09-30

## 2024-09-30 ASSESSMENT — ENCOUNTER SYMPTOMS
EYE PAIN: 0
PALPITATIONS: 0
SLEEP DISTURBANCE: 0
HALLUCINATIONS: 0
DIZZINESS: 0
TROUBLE SWALLOWING: 0
ABDOMINAL PAIN: 0
BRUISES/BLEEDS EASILY: 0
BACK PAIN: 0
NAUSEA: 0
FREQUENCY: 0
DECREASED CONCENTRATION: 1
NECK PAIN: 0
SEIZURES: 0
SHORTNESS OF BREATH: 0
DIFFICULTY URINATING: 0
AGITATION: 0
SINUS PRESSURE: 0
ARTHRALGIAS: 0
WHEEZING: 0
UNEXPECTED WEIGHT CHANGE: 0
LIGHT-HEADEDNESS: 0
TREMORS: 0
CONFUSION: 0
PHOTOPHOBIA: 0
HEADACHES: 0
SPEECH DIFFICULTY: 0
NERVOUS/ANXIOUS: 1
JOINT SWELLING: 0
FACIAL ASYMMETRY: 0
WEAKNESS: 0
COUGH: 0
FATIGUE: 0
FEVER: 0
NECK STIFFNESS: 0
NUMBNESS: 0
VOMITING: 0
HYPERACTIVE: 0
ADENOPATHY: 0

## 2024-09-30 ASSESSMENT — PATIENT HEALTH QUESTIONNAIRE - PHQ9
2. FEELING DOWN, DEPRESSED OR HOPELESS: NOT AT ALL
SUM OF ALL RESPONSES TO PHQ9 QUESTIONS 1 & 2: 0
1. LITTLE INTEREST OR PLEASURE IN DOING THINGS: NOT AT ALL

## 2024-09-30 NOTE — PROGRESS NOTES
Edwin Soliman  24 y.o.       SUBJECTIVE    Encephalopathy  Presenting symptoms: no confusion    Associated symptoms: no abdominal pain, no agitation, no fever, no hallucinations, no headaches, no light-headedness, no nausea, no palpitations, no rash, no seizures, no vomiting and no weakness       Edwin is a 24-year-old young boy who was seen today for follow-up of his encephalopathy due to autism spectrum disorder with decreased attention span and impulsivity and anxiety.  Since last seen has been quite anxious and is very sensitive to loud noise.  Today his physical and neurological examination was normal.  I would like to continue his Focalin XR 30 that we staking since he was having problem with Adderall and he was tried on Concerta and Vyvanse in the past.  I have discussed the controlled substance policy, abuse potential risk and benefit if he still having issues with anxiety may need to see a psychologist for counseling and evaluation.  I have scheduled him to come back and see me in 3 months.    I did review the medication list.  Due to technical limitations of voice recognition and human error, this note may not accurately reflect the care of the patient.    Review of Systems   Constitutional:  Negative for fatigue, fever and unexpected weight change.   HENT:  Negative for dental problem, ear pain, hearing loss, sinus pressure, tinnitus and trouble swallowing.    Eyes:  Negative for photophobia, pain and visual disturbance.   Respiratory:  Negative for cough, shortness of breath and wheezing.    Cardiovascular:  Negative for chest pain, palpitations and leg swelling.   Gastrointestinal:  Negative for abdominal pain, nausea and vomiting.   Genitourinary:  Negative for difficulty urinating, enuresis and frequency.   Musculoskeletal:  Negative for arthralgias, back pain, joint swelling, neck pain and neck stiffness.   Skin:  Negative for pallor and rash.   Allergic/Immunologic: Negative for food allergies.  "  Neurological:  Negative for dizziness, tremors, seizures, syncope, facial asymmetry, speech difficulty, weakness, light-headedness, numbness and headaches.   Hematological:  Negative for adenopathy. Does not bruise/bleed easily.   Psychiatric/Behavioral:  Positive for decreased concentration. Negative for agitation, behavioral problems, confusion, hallucinations and sleep disturbance. The patient is nervous/anxious. The patient is not hyperactive.         Patient Active Problem List   Diagnosis    ADHD (attention deficit hyperactivity disorder), combined type    Anxiety    Autistic disorder, active ([facility])    Encephalopathy    Impulse disorder    PDA (patent ductus arteriosus) ([facility])    Attention deficit hyperactivity disorder (ADHD)    History of mental disorder    Impulsiveness    Attention deficit hyperactivity disorder (ADHD), combined type    Disorder of brain    Impulse control disorder     Past Medical History:   Diagnosis Date    Personal history of other mental and behavioral disorders     History of attention deficit hyperactivity disorder (ADHD)     Past Surgical History:   Procedure Laterality Date    CARDIAC SURGERY  04/20/2017    Heart Surgery       reports that he has never smoked. He has never used smokeless tobacco. He reports that he does not drink alcohol and does not use drugs.    /80 (BP Location: Right arm, Patient Position: Sitting, BP Cuff Size: Large adult)   Pulse 101   Ht 1.717 m (5' 7.58\")   Wt 118 kg (261 lb)   BMI 40.18 kg/m²     OBJECTIVE  Physical Exam/Neurological Exam   Physical Exam/Neurological Exam   Constitutional: General appearance: no acute distress cognitive delayed and at times inappropriate and slow to respond  Auscultation of Heart: Regular rate and rhythm, no murmurs, normal S1 and S2.   Carotid Arteries: Intact without any bruits.   Neck is supple.   No lymph adenopathy.   Peripheral Vascular Exam: Pulses +2 and equal in all extremities. No swelling, " varicosities, edema or tenderness to palpations.   Abdomen is soft, nondistended. No organomegaly.  Mental status: The patient was in no distress, alert, interactive and cooperative. Affect is inappropriate patient is cognitively delayed.  Orientation: oriented to person, oriented to place and oriented to time.   Memory: recent memory intact and remote memory intact.   Attention: normal attention span and normal concentrating ability.   Language: normal comprehension and no difficulty naming common objects.   Fund of knowledge: Patient displays inadequate knowledge of current events, inadequate fund of knowledge regarding past history and inadequate fund of knowledge regarding vocabulary.   Eyes: The ophthalmoscopic examination was normal. The fundi are visualized with normal disc margins and without.  Cranial nerve II: Visual fields full to confrontation.   Cranial nerves III, IV, and VI: Pupils round, equally reactive to light; no ptosis. EOMs intact. No nystagmus.   Cranial Nerve V: Facial sensation intact bilaterally.   Cranial nerve VII: Normal and symmetric facial strength.   Cranial nerve VIII: Hearing is intact bilaterally to finger rub / whisper.   Cranial nerves IX and X: Palate elevates symmetrically.   Cranial nerve XI: Shoulder shrug and neck rotation strength are intact.   Cranial nerve XII: Tongue midline with normal strength.   Motor: Motor exam was normal. Muscle bulk was normal in both upper and lower extremities. Muscle tone was normal in both upper and lower extremities. Muscle strength was 5/5 throughout. no abnormal or adventitious movements were present.   Deep Tendon Reflexes: left biceps 2+ , right biceps 2+, left triceps 2+, right triceps 2+, left brachioradialis 2+, right brachioradialis 2+, left patella 2+, right patella 2+, left ankle jerk 2+, right ankle jerk 2+   Plantar Reflex: Toes downgoing to plantar stimulation on the left. Toes downgoing to plantar stimulation on the right.    Sensory Exam: Normal to light touch.   Coordination: There is no limb dystaxia and rapid alternating movements are intact.  Gait: Gait is normal without spasticity.  He was ambulating without any difficulty      ASSESSMENT/PLAN  Diagnoses and all orders for this visit:  Encephalopathy  Anxiety  -     FLUoxetine (PROzac) 40 mg capsule; Take 1 capsule (40 mg) by mouth once daily.  Autism spectrum disorder ([facility])  -     risperiDONE (RisperDAL) 1 mg tablet; Take 1 tablet (1 mg) by mouth 2 times a day.  Attention deficit hyperactivity disorder (ADHD), combined type  -     dexmethylphenidate XR (Focalin XR) 30 mg 24 hr capsule; Take 1 capsule (30 mg) by mouth once daily. Do not crush, chew, or split. Do not fill before October 24, 2024.  -     dexmethylphenidate XR (Focalin XR) 30 mg 24 hr capsule; Take 1 capsule (30 mg) by mouth once daily. Do not crush, chew, or split. Do not fill before November 23, 2024.  Autistic disorder, active ([facility])  Impulse disorder        Markel Littlejohn MD  9/30/2024  12:20 PM

## 2024-12-16 ENCOUNTER — APPOINTMENT (OUTPATIENT)
Dept: NEUROLOGY | Facility: CLINIC | Age: 24
End: 2024-12-16
Payer: MEDICAID

## 2024-12-16 VITALS
BODY MASS INDEX: 32.97 KG/M2 | HEART RATE: 78 BPM | SYSTOLIC BLOOD PRESSURE: 122 MMHG | WEIGHT: 222.6 LBS | HEIGHT: 69 IN | DIASTOLIC BLOOD PRESSURE: 74 MMHG

## 2024-12-16 DIAGNOSIS — G93.40 ENCEPHALOPATHY, UNSPECIFIED TYPE: Primary | ICD-10-CM

## 2024-12-16 DIAGNOSIS — F84.0 AUTISTIC DISORDER, ACTIVE (HHS-HCC): ICD-10-CM

## 2024-12-16 DIAGNOSIS — F90.2 ATTENTION DEFICIT HYPERACTIVITY DISORDER (ADHD), COMBINED TYPE: ICD-10-CM

## 2024-12-16 DIAGNOSIS — F90.0 ATTENTION DEFICIT HYPERACTIVITY DISORDER (ADHD), PREDOMINANTLY INATTENTIVE TYPE: ICD-10-CM

## 2024-12-16 DIAGNOSIS — F41.9 ANXIETY: ICD-10-CM

## 2024-12-16 DIAGNOSIS — F63.9 IMPULSE DISORDER: ICD-10-CM

## 2024-12-16 DIAGNOSIS — F84.0 AUTISM SPECTRUM DISORDER (HHS-HCC): ICD-10-CM

## 2024-12-16 PROCEDURE — 1036F TOBACCO NON-USER: CPT | Performed by: PSYCHIATRY & NEUROLOGY

## 2024-12-16 PROCEDURE — 3008F BODY MASS INDEX DOCD: CPT | Performed by: PSYCHIATRY & NEUROLOGY

## 2024-12-16 PROCEDURE — 99214 OFFICE O/P EST MOD 30 MIN: CPT | Performed by: PSYCHIATRY & NEUROLOGY

## 2024-12-16 RX ORDER — FLUOXETINE HYDROCHLORIDE 40 MG/1
40 CAPSULE ORAL DAILY
Qty: 30 CAPSULE | Refills: 3 | Status: SHIPPED | OUTPATIENT
Start: 2024-12-16 | End: 2024-12-16 | Stop reason: SDUPTHER

## 2024-12-16 RX ORDER — DEXMETHYLPHENIDATE HYDROCHLORIDE 10 MG/1
10 TABLET ORAL DAILY
Qty: 30 TABLET | Refills: 0 | Status: SHIPPED | OUTPATIENT
Start: 2025-02-14 | End: 2025-03-16

## 2024-12-16 RX ORDER — DEXMETHYLPHENIDATE HYDROCHLORIDE 30 MG/1
30 CAPSULE, EXTENDED RELEASE ORAL DAILY
Qty: 30 CAPSULE | Refills: 0 | Status: SHIPPED | OUTPATIENT
Start: 2025-02-14 | End: 2025-03-16

## 2024-12-16 RX ORDER — GUANFACINE 2 MG/1
2 TABLET, EXTENDED RELEASE ORAL DAILY
Qty: 30 TABLET | Refills: 3 | Status: SHIPPED | OUTPATIENT
Start: 2024-12-16

## 2024-12-16 RX ORDER — DEXMETHYLPHENIDATE HYDROCHLORIDE 30 MG/1
30 CAPSULE, EXTENDED RELEASE ORAL DAILY
Qty: 30 CAPSULE | Refills: 0 | Status: SHIPPED | OUTPATIENT
Start: 2025-01-15 | End: 2025-02-14

## 2024-12-16 RX ORDER — FLUOXETINE HYDROCHLORIDE 40 MG/1
40 CAPSULE ORAL DAILY
Qty: 30 CAPSULE | Refills: 3 | Status: SHIPPED | OUTPATIENT
Start: 2024-12-16

## 2024-12-16 RX ORDER — DEXMETHYLPHENIDATE HYDROCHLORIDE 10 MG/1
10 TABLET ORAL DAILY
Qty: 30 TABLET | Refills: 0 | Status: SHIPPED | OUTPATIENT
Start: 2025-01-15 | End: 2025-02-14

## 2024-12-16 RX ORDER — DEXMETHYLPHENIDATE HYDROCHLORIDE 10 MG/1
10 TABLET ORAL DAILY
Qty: 30 TABLET | Refills: 0 | Status: SHIPPED | OUTPATIENT
Start: 2024-12-16 | End: 2025-01-15

## 2024-12-16 RX ORDER — DEXMETHYLPHENIDATE HYDROCHLORIDE 30 MG/1
30 CAPSULE, EXTENDED RELEASE ORAL DAILY
Qty: 30 CAPSULE | Refills: 0 | Status: SHIPPED | OUTPATIENT
Start: 2024-12-16 | End: 2025-01-15

## 2024-12-16 RX ORDER — RISPERIDONE 1 MG/1
1 TABLET ORAL 2 TIMES DAILY
Qty: 180 TABLET | Refills: 0 | Status: SHIPPED | OUTPATIENT
Start: 2024-12-16

## 2024-12-16 ASSESSMENT — ENCOUNTER SYMPTOMS
WEAKNESS: 0
SLEEP DISTURBANCE: 1
COUGH: 0
JOINT SWELLING: 0
DIFFICULTY URINATING: 0
AGITATION: 0
NERVOUS/ANXIOUS: 1
BACK PAIN: 0
WHEEZING: 0
FACIAL ASYMMETRY: 0
NECK PAIN: 0
TROUBLE SWALLOWING: 0
PHOTOPHOBIA: 0
TREMORS: 0
PALPITATIONS: 0
SHORTNESS OF BREATH: 0
NUMBNESS: 0
SPEECH DIFFICULTY: 0
ARTHRALGIAS: 0
FEVER: 0
HYPERACTIVE: 1
HALLUCINATIONS: 0
DIZZINESS: 0
BRUISES/BLEEDS EASILY: 0
FREQUENCY: 0
NECK STIFFNESS: 0
ABDOMINAL PAIN: 0
EYE PAIN: 0
UNEXPECTED WEIGHT CHANGE: 0
NAUSEA: 0
SEIZURES: 0
CONFUSION: 0
HEADACHES: 0
FATIGUE: 0
VOMITING: 0
LIGHT-HEADEDNESS: 0
DECREASED CONCENTRATION: 1
ADENOPATHY: 0
SINUS PRESSURE: 0

## 2024-12-16 ASSESSMENT — PATIENT HEALTH QUESTIONNAIRE - PHQ9
1. LITTLE INTEREST OR PLEASURE IN DOING THINGS: NOT AT ALL
2. FEELING DOWN, DEPRESSED OR HOPELESS: NOT AT ALL
SUM OF ALL RESPONSES TO PHQ9 QUESTIONS 1 & 2: 0

## 2024-12-16 NOTE — PROGRESS NOTES
Edwin Soliman  24 y.o.       SUBJECTIVE  Edwin is a 24-year-old young man who was seen today for follow-up of his encephalopathy due to autism spectrum disorder with impulsive behavior and decreased attention span.  Since last seen he is doing well on his medication is having his movement where he is very active and always on the go.  Today's physical and neurological is unchanged.  I would like to keep all his medicine the way he is taking restarting back on Intuniv and add Focalin 10 mg in the afternoon as needed and depending on how he does might adjust his medication at a later date    I have scheduled to come back and see me in 3 months    I have discussed the controlled substance policy, abuse potential risk and the benefit with his aunt who accompanied him      Due to technical limitations of voice recognition and human error, this note may not accurately reflect the care of the patient.    Review of Systems   Constitutional:  Negative for fatigue, fever and unexpected weight change.   HENT:  Negative for dental problem, ear pain, hearing loss, sinus pressure, tinnitus and trouble swallowing.    Eyes:  Negative for photophobia, pain and visual disturbance.   Respiratory:  Negative for cough, shortness of breath and wheezing.    Cardiovascular:  Negative for chest pain, palpitations and leg swelling.   Gastrointestinal:  Negative for abdominal pain, nausea and vomiting.   Genitourinary:  Negative for difficulty urinating, enuresis and frequency.   Musculoskeletal:  Negative for arthralgias, back pain, joint swelling, neck pain and neck stiffness.   Skin:  Negative for pallor and rash.   Allergic/Immunologic: Negative for food allergies.   Neurological:  Negative for dizziness, tremors, seizures, syncope, facial asymmetry, speech difficulty, weakness, light-headedness, numbness and headaches.   Hematological:  Negative for adenopathy. Does not bruise/bleed easily.   Psychiatric/Behavioral:  Positive for  "decreased concentration and sleep disturbance. Negative for agitation, behavioral problems, confusion and hallucinations. The patient is nervous/anxious and is hyperactive.         Patient Active Problem List   Diagnosis    ADHD (attention deficit hyperactivity disorder), combined type    Anxiety    Autistic disorder, active ([facility])    Encephalopathy    Impulse disorder    PDA (patent ductus arteriosus) ([facility])    Attention deficit hyperactivity disorder (ADHD)    History of mental disorder    Impulsiveness    Attention deficit hyperactivity disorder (ADHD), combined type    Disorder of brain    Impulse control disorder     Past Medical History:   Diagnosis Date    Personal history of other mental and behavioral disorders     History of attention deficit hyperactivity disorder (ADHD)     Past Surgical History:   Procedure Laterality Date    CARDIAC SURGERY  04/20/2017    Heart Surgery       reports that he has never smoked. He has never used smokeless tobacco. He reports that he does not drink alcohol and does not use drugs.    /74 (BP Location: Left arm, Patient Position: Sitting, BP Cuff Size: Adult)   Pulse 78   Ht 1.753 m (5' 9\")   Wt 101 kg (222 lb 9.6 oz)   BMI 32.87 kg/m²     OBJECTIVE  Physical Exam/Neurological Exam   Constitutional: General appearance: no acute distress cognitive delayed and at times inappropriate and slow to respond  Auscultation of Heart: Regular rate and rhythm, no murmurs, normal S1 and S2.   Carotid Arteries: Intact without any bruits.   Neck is supple.   No lymph adenopathy.   Peripheral Vascular Exam: Pulses +2 and equal in all extremities. No swelling, varicosities, edema or tenderness to palpations.   Abdomen is soft, nondistended. No organomegaly.  Mental status: The patient was in no distress, alert, interactive and cooperative. Affect is inappropriate patient is cognitively delayed.  Orientation: oriented to person, oriented to place and oriented to time. "   Memory: recent memory intact and remote memory intact.   Attention: normal attention span and normal concentrating ability.   Language: normal comprehension and no difficulty naming common objects.   Fund of knowledge: Patient displays inadequate knowledge of current events, inadequate fund of knowledge regarding past history and inadequate fund of knowledge regarding vocabulary.   Eyes: The ophthalmoscopic examination was normal. The fundi are visualized with normal disc margins and without.  Cranial nerve II: Visual fields full to confrontation.   Cranial nerves III, IV, and VI: Pupils round, equally reactive to light; no ptosis. EOMs intact. No nystagmus.   Cranial Nerve V: Facial sensation intact bilaterally.   Cranial nerve VII: Normal and symmetric facial strength.   Cranial nerve VIII: Hearing is intact bilaterally to finger rub / whisper.   Cranial nerves IX and X: Palate elevates symmetrically.   Cranial nerve XI: Shoulder shrug and neck rotation strength are intact.   Cranial nerve XII: Tongue midline with normal strength.   Motor: Motor exam was normal. Muscle bulk was normal in both upper and lower extremities. Muscle tone was normal in both upper and lower extremities. Muscle strength was 5/5 throughout. no abnormal or adventitious movements were present.   Deep Tendon Reflexes: left biceps 2+ , right biceps 2+, left triceps 2+, right triceps 2+, left brachioradialis 2+, right brachioradialis 2+, left patella 2+, right patella 2+, left ankle jerk 2+, right ankle jerk 2+   Plantar Reflex: Toes downgoing to plantar stimulation on the left. Toes downgoing to plantar stimulation on the right.   Sensory Exam: Normal to light touch.   Coordination: There is no limb dystaxia and rapid alternating movements are intact.  Gait: Gait is normal without spasticity.  He was ambulating without any difficulty      ASSESSMENT/PLAN  Diagnoses and all orders for this visit:  Encephalopathy, unspecified type  Attention  deficit hyperactivity disorder (ADHD), combined type  -     dexmethylphenidate XR (Focalin XR) 30 mg 24 hr capsule; Take 1 capsule (30 mg) by mouth once daily. Do not crush, chew, or split.  -     dexmethylphenidate XR (Focalin XR) 30 mg 24 hr capsule; Take 1 capsule (30 mg) by mouth once daily. Do not crush, chew, or split. Do not fill before January 15, 2025.  -     guanFACINE (Intuniv) 2 mg ER 24 hr tablet; Take 1 tablet (2 mg) by mouth once daily.  -     dexmethylphenidate (Focalin) 10 mg tablet; Take 1 tablet (10 mg) by mouth once daily. At 4 pm.  -     dexmethylphenidate (Focalin) 10 mg tablet; Take 1 tablet (10 mg) by mouth once daily. At 4 pm. Do not fill before January 15, 2025.  -     dexmethylphenidate (Focalin) 10 mg tablet; Take 1 tablet (10 mg) by mouth once daily. At 4 pm. Do not fill before February 14, 2025.  Attention deficit hyperactivity disorder (ADHD), predominantly inattentive type  -     dexmethylphenidate XR (Focalin XR) 30 mg 24 hr capsule; Take 1 capsule (30 mg) by mouth once daily. Do not crush, chew, or split. Do not fill before February 14, 2025.  Anxiety  -     FLUoxetine (PROzac) 40 mg capsule; Take 1 capsule (40 mg) by mouth once daily.  Autism spectrum disorder ([facility])  -     risperiDONE (RisperDAL) 1 mg tablet; Take 1 tablet (1 mg) by mouth 2 times a day.  Autistic disorder, active ([facility])  Impulse disorder        Markel Littlejohn MD  12/16/2024  3:04 PM

## 2025-02-11 DIAGNOSIS — F41.9 ANXIETY: ICD-10-CM

## 2025-02-11 RX ORDER — FLUOXETINE 10 MG/1
10 CAPSULE ORAL DAILY
Qty: 90 CAPSULE | Refills: 1 | OUTPATIENT
Start: 2025-02-11

## 2025-02-11 RX ORDER — FLUOXETINE HYDROCHLORIDE 20 MG/1
20 CAPSULE ORAL DAILY
Qty: 90 CAPSULE | Refills: 1 | OUTPATIENT
Start: 2025-02-11

## 2025-03-10 ENCOUNTER — APPOINTMENT (OUTPATIENT)
Dept: NEUROLOGY | Facility: CLINIC | Age: 25
End: 2025-03-10
Payer: MEDICAID

## 2025-03-17 ENCOUNTER — APPOINTMENT (OUTPATIENT)
Dept: NEUROLOGY | Facility: CLINIC | Age: 25
End: 2025-03-17
Payer: MEDICAID

## 2025-03-17 ENCOUNTER — TELEPHONE (OUTPATIENT)
Dept: NEUROLOGY | Facility: CLINIC | Age: 25
End: 2025-03-17

## 2025-03-17 VITALS
SYSTOLIC BLOOD PRESSURE: 110 MMHG | WEIGHT: 225 LBS | DIASTOLIC BLOOD PRESSURE: 82 MMHG | BODY MASS INDEX: 34.1 KG/M2 | HEIGHT: 68 IN | HEART RATE: 84 BPM

## 2025-03-17 DIAGNOSIS — F84.0 AUTISM SPECTRUM DISORDER (HHS-HCC): ICD-10-CM

## 2025-03-17 DIAGNOSIS — G40.802 DEVELOPMENTAL AND EPILEPTIC ENCEPHALOPATHY: Primary | ICD-10-CM

## 2025-03-17 DIAGNOSIS — F90.2 ATTENTION DEFICIT HYPERACTIVITY DISORDER (ADHD), COMBINED TYPE: ICD-10-CM

## 2025-03-17 DIAGNOSIS — G93.45 DEVELOPMENTAL AND EPILEPTIC ENCEPHALOPATHY: Primary | ICD-10-CM

## 2025-03-17 DIAGNOSIS — F90.0 ATTENTION DEFICIT HYPERACTIVITY DISORDER (ADHD), PREDOMINANTLY INATTENTIVE TYPE: ICD-10-CM

## 2025-03-17 DIAGNOSIS — F41.9 ANXIETY: ICD-10-CM

## 2025-03-17 PROCEDURE — 99214 OFFICE O/P EST MOD 30 MIN: CPT | Performed by: PSYCHIATRY & NEUROLOGY

## 2025-03-17 PROCEDURE — 3008F BODY MASS INDEX DOCD: CPT | Performed by: PSYCHIATRY & NEUROLOGY

## 2025-03-17 PROCEDURE — 1036F TOBACCO NON-USER: CPT | Performed by: PSYCHIATRY & NEUROLOGY

## 2025-03-17 RX ORDER — DEXMETHYLPHENIDATE HYDROCHLORIDE 10 MG/1
10 TABLET ORAL DAILY
Qty: 30 TABLET | Refills: 0 | Status: SHIPPED | OUTPATIENT
Start: 2025-03-17 | End: 2025-04-16

## 2025-03-17 RX ORDER — DEXMETHYLPHENIDATE HYDROCHLORIDE 30 MG/1
30 CAPSULE, EXTENDED RELEASE ORAL DAILY
Qty: 30 CAPSULE | Refills: 0 | Status: SHIPPED | OUTPATIENT
Start: 2025-05-16 | End: 2025-06-15

## 2025-03-17 RX ORDER — DEXMETHYLPHENIDATE HYDROCHLORIDE 10 MG/1
10 TABLET ORAL DAILY
Qty: 30 TABLET | Refills: 0 | Status: SHIPPED | OUTPATIENT
Start: 2025-05-16 | End: 2025-06-15

## 2025-03-17 RX ORDER — RISPERIDONE 1 MG/1
1 TABLET ORAL 2 TIMES DAILY
Qty: 60 TABLET | Refills: 3 | Status: SHIPPED | OUTPATIENT
Start: 2025-03-17

## 2025-03-17 RX ORDER — DEXMETHYLPHENIDATE HYDROCHLORIDE 30 MG/1
30 CAPSULE, EXTENDED RELEASE ORAL DAILY
Qty: 30 CAPSULE | Refills: 0 | Status: SHIPPED | OUTPATIENT
Start: 2025-04-16 | End: 2025-05-16

## 2025-03-17 RX ORDER — DEXMETHYLPHENIDATE HYDROCHLORIDE 30 MG/1
30 CAPSULE, EXTENDED RELEASE ORAL DAILY
Qty: 30 CAPSULE | Refills: 0 | Status: SHIPPED | OUTPATIENT
Start: 2025-03-17 | End: 2025-04-16

## 2025-03-17 RX ORDER — DEXMETHYLPHENIDATE HYDROCHLORIDE 10 MG/1
10 TABLET ORAL DAILY
Qty: 30 TABLET | Refills: 0 | Status: SHIPPED | OUTPATIENT
Start: 2025-04-16 | End: 2025-05-16

## 2025-03-17 RX ORDER — GUANFACINE 2 MG/1
2 TABLET, EXTENDED RELEASE ORAL DAILY
Qty: 30 TABLET | Refills: 3 | Status: SHIPPED | OUTPATIENT
Start: 2025-03-17

## 2025-03-17 RX ORDER — FLUOXETINE HYDROCHLORIDE 40 MG/1
40 CAPSULE ORAL DAILY
Qty: 30 CAPSULE | Refills: 3 | Status: SHIPPED | OUTPATIENT
Start: 2025-03-17

## 2025-03-17 ASSESSMENT — ENCOUNTER SYMPTOMS
TREMORS: 0
JOINT SWELLING: 0
FEVER: 0
AGITATION: 0
SPEECH DIFFICULTY: 0
FACIAL ASYMMETRY: 0
TROUBLE SWALLOWING: 0
DYSPHORIC MOOD: 1
ADENOPATHY: 0
NECK STIFFNESS: 0
DIZZINESS: 0
BACK PAIN: 0
ARTHRALGIAS: 0
NAUSEA: 0
SHORTNESS OF BREATH: 0
ABDOMINAL PAIN: 0
WEAKNESS: 0
SLEEP DISTURBANCE: 1
HYPERACTIVE: 0
BRUISES/BLEEDS EASILY: 0
CONFUSION: 0
NECK PAIN: 0
UNEXPECTED WEIGHT CHANGE: 0
DIFFICULTY URINATING: 0
NUMBNESS: 0
HEADACHES: 0
DECREASED CONCENTRATION: 1
HALLUCINATIONS: 0
EYE PAIN: 0
COUGH: 0
WHEEZING: 0
PHOTOPHOBIA: 0
LIGHT-HEADEDNESS: 0
FREQUENCY: 0
VOMITING: 0
SEIZURES: 0
SINUS PRESSURE: 0
PALPITATIONS: 0
FATIGUE: 0

## 2025-03-17 NOTE — PROGRESS NOTES
Edwin Soliman  24 y.o.       SUBJECTIVE  Edwin is a 24-year-old young man who was seen today for follow-up of his encephalopathy due to autism spectrum disorder with difficulty at work at home.  Since last seen he has done very well on all his medication and he can tell a big difference when he is on medicine compared off medicine.  No side effects of the medication with today's physical and neurological examination is unchanged.  I would like to continue all his medications away staking and have discussed the controlled substance policy, abuse potential, risk benefit and the precautions to be taken and depending on how he does I might make future recommendation when he comes back to see me in 3 months    I did review the medication list.      Due to technical limitations of voice recognition and human error, this note may not accurately reflect the care of the patient.    Review of Systems   Constitutional:  Negative for fatigue, fever and unexpected weight change.   HENT:  Negative for dental problem, ear pain, hearing loss, sinus pressure, tinnitus and trouble swallowing.    Eyes:  Negative for photophobia, pain and visual disturbance.   Respiratory:  Negative for cough, shortness of breath and wheezing.    Cardiovascular:  Negative for chest pain, palpitations and leg swelling.   Gastrointestinal:  Negative for abdominal pain, nausea and vomiting.   Genitourinary:  Negative for difficulty urinating, enuresis and frequency.   Musculoskeletal:  Negative for arthralgias, back pain, joint swelling, neck pain and neck stiffness.   Skin:  Negative for pallor and rash.   Allergic/Immunologic: Negative for food allergies.   Neurological:  Negative for dizziness, tremors, seizures, syncope, facial asymmetry, speech difficulty, weakness, light-headedness, numbness and headaches.   Hematological:  Negative for adenopathy. Does not bruise/bleed easily.   Psychiatric/Behavioral:  Positive for behavioral problems,  "decreased concentration, dysphoric mood and sleep disturbance. Negative for agitation, confusion and hallucinations. The patient is not hyperactive.         Patient Active Problem List   Diagnosis    ADHD (attention deficit hyperactivity disorder), combined type    Anxiety    Autistic disorder, active ([facility])    Encephalopathy    Impulse disorder    PDA (patent ductus arteriosus) ([facility])    Attention deficit hyperactivity disorder (ADHD)    History of mental disorder    Impulsiveness    Attention deficit hyperactivity disorder (ADHD), combined type    Disorder of brain    Impulse control disorder     Past Medical History:   Diagnosis Date    Personal history of other mental and behavioral disorders     History of attention deficit hyperactivity disorder (ADHD)     Past Surgical History:   Procedure Laterality Date    CARDIAC SURGERY  04/20/2017    Heart Surgery       reports that he has never smoked. He has never used smokeless tobacco. He reports that he does not drink alcohol and does not use drugs.    /82 (BP Location: Right arm, Patient Position: Sitting, BP Cuff Size: Adult)   Pulse 84   Ht 1.727 m (5' 8\")   Wt 102 kg (225 lb)   BMI 34.21 kg/m²     OBJECTIVE  Physical Exam/Neurological Exam   Constitutional: General appearance: no acute distress cognitive delayed and at times inappropriate and slow to respond  Auscultation of Heart: Regular rate and rhythm, no murmurs, normal S1 and S2.   Carotid Arteries: Intact without any bruits.   Neck is supple.   No lymph adenopathy.   Peripheral Vascular Exam: Pulses +2 and equal in all extremities. No swelling, varicosities, edema or tenderness to palpations.   Abdomen is soft, nondistended. No organomegaly.  Mental status: The patient was in no distress, alert, interactive and cooperative. Affect is inappropriate patient is cognitively delayed.  Orientation: oriented to person, oriented to place and oriented to time.   Memory: recent memory intact and " remote memory intact.   Attention: normal attention span and normal concentrating ability.   Language: normal comprehension and no difficulty naming common objects.   Fund of knowledge: Patient displays inadequate knowledge of current events, inadequate fund of knowledge regarding past history and inadequate fund of knowledge regarding vocabulary.   Eyes: The ophthalmoscopic examination was normal. The fundi are visualized with normal disc margins and without.  Cranial nerve II: Visual fields full to confrontation.   Cranial nerves III, IV, and VI: Pupils round, equally reactive to light; no ptosis. EOMs intact. No nystagmus.   Cranial Nerve V: Facial sensation intact bilaterally.   Cranial nerve VII: Normal and symmetric facial strength.   Cranial nerve VIII: Hearing is intact bilaterally to finger rub / whisper.   Cranial nerves IX and X: Palate elevates symmetrically.   Cranial nerve XI: Shoulder shrug and neck rotation strength are intact.   Cranial nerve XII: Tongue midline with normal strength.   Motor: Motor exam was normal. Muscle bulk was normal in both upper and lower extremities. Muscle tone was normal in both upper and lower extremities. Muscle strength was 5/5 throughout. no abnormal or adventitious movements were present.   Deep Tendon Reflexes: left biceps 2+ , right biceps 2+, left triceps 2+, right triceps 2+, left brachioradialis 2+, right brachioradialis 2+, left patella 2+, right patella 2+, left ankle jerk 2+, right ankle jerk 2+   Plantar Reflex: Toes downgoing to plantar stimulation on the left. Toes downgoing to plantar stimulation on the right.   Sensory Exam: Normal to light touch.   Coordination: There is no limb dystaxia and rapid alternating movements are intact.  Gait: Gait is normal without spasticity.  He was ambulating without any difficulty      ASSESSMENT/PLAN  Diagnoses and all orders for this visit:  Developmental and epileptic encephalopathy  Attention deficit hyperactivity  disorder (ADHD), combined type  -     dexmethylphenidate XR (Focalin XR) 30 mg 24 hr capsule; Take 1 capsule (30 mg) by mouth once daily. Do not crush, chew, or split.  -     dexmethylphenidate XR (Focalin XR) 30 mg 24 hr capsule; Take 1 capsule (30 mg) by mouth once daily. Do not crush, chew, or split. Do not fill before April 16, 2025.  -     dexmethylphenidate (Focalin) 10 mg tablet; Take 1 tablet (10 mg) by mouth once daily. At 4 pm.  -     dexmethylphenidate (Focalin) 10 mg tablet; Take 1 tablet (10 mg) by mouth once daily. At 4 pm. Do not fill before April 16, 2025.  -     dexmethylphenidate (Focalin) 10 mg tablet; Take 1 tablet (10 mg) by mouth once daily. At 4 pm. Do not fill before May 16, 2025.  -     guanFACINE (Intuniv) 2 mg ER 24 hr tablet; Take 1 tablet (2 mg) by mouth once daily.  -     Methylphenidate And Metabolite,Conf,Urine  Attention deficit hyperactivity disorder (ADHD), predominantly inattentive type  -     dexmethylphenidate XR (Focalin XR) 30 mg 24 hr capsule; Take 1 capsule (30 mg) by mouth once daily. Do not crush, chew, or split. Do not fill before May 16, 2025.  -     Methylphenidate And Metabolite,Conf,Urine  Anxiety  -     FLUoxetine (PROzac) 40 mg capsule; Take 1 capsule (40 mg) by mouth once daily.  Autism spectrum disorder ([facility])  -     risperiDONE (RisperDAL) 1 mg tablet; Take 1 tablet (1 mg) by mouth 2 times a day.        Markel Littlejohn MD  3/17/2025  12:58 PM

## 2025-03-19 LAB
DRUG SCREEN COMMENT UR-IMP: ABNORMAL
QUEST NOTES AND COMMENTS: ABNORMAL
QUEST RITALINIC ACID URINE: ABNORMAL NG/ML

## 2025-05-07 ENCOUNTER — TELEPHONE (OUTPATIENT)
Dept: NEUROLOGY | Facility: CLINIC | Age: 25
End: 2025-05-07
Payer: MEDICARE

## 2025-05-07 NOTE — TELEPHONE ENCOUNTER
Pt's mom called stating a PA is needed for the dexmethylphenidate. Pt has been out of meds for 2 weeks.  Thanks.

## 2025-05-08 ENCOUNTER — TELEPHONE (OUTPATIENT)
Dept: NEUROLOGY | Facility: CLINIC | Age: 25
End: 2025-05-08
Payer: MEDICARE

## 2025-06-09 ENCOUNTER — APPOINTMENT (OUTPATIENT)
Dept: NEUROLOGY | Facility: CLINIC | Age: 25
End: 2025-06-09
Payer: MEDICARE

## 2025-06-09 VITALS
BODY MASS INDEX: 35.61 KG/M2 | SYSTOLIC BLOOD PRESSURE: 116 MMHG | WEIGHT: 235 LBS | HEIGHT: 68 IN | HEART RATE: 90 BPM | DIASTOLIC BLOOD PRESSURE: 74 MMHG

## 2025-06-09 DIAGNOSIS — G93.40 ENCEPHALOPATHY, UNSPECIFIED TYPE: Primary | ICD-10-CM

## 2025-06-09 DIAGNOSIS — F63.9 IMPULSE CONTROL DISORDER: ICD-10-CM

## 2025-06-09 DIAGNOSIS — F90.2 ATTENTION DEFICIT HYPERACTIVITY DISORDER (ADHD), COMBINED TYPE: ICD-10-CM

## 2025-06-09 DIAGNOSIS — F84.0 AUTISTIC DISORDER, ACTIVE (HHS-HCC): ICD-10-CM

## 2025-06-09 DIAGNOSIS — F41.9 ANXIETY: ICD-10-CM

## 2025-06-09 PROCEDURE — 1036F TOBACCO NON-USER: CPT | Performed by: PSYCHIATRY & NEUROLOGY

## 2025-06-09 PROCEDURE — 99214 OFFICE O/P EST MOD 30 MIN: CPT | Performed by: PSYCHIATRY & NEUROLOGY

## 2025-06-09 PROCEDURE — 3008F BODY MASS INDEX DOCD: CPT | Performed by: PSYCHIATRY & NEUROLOGY

## 2025-06-09 RX ORDER — DEXMETHYLPHENIDATE HYDROCHLORIDE 10 MG/1
15 TABLET ORAL 2 TIMES DAILY
Qty: 90 TABLET | Refills: 0 | Status: SHIPPED | OUTPATIENT
Start: 2025-06-09 | End: 2025-07-09

## 2025-06-09 RX ORDER — FLUOXETINE HYDROCHLORIDE 40 MG/1
40 CAPSULE ORAL DAILY
Qty: 30 CAPSULE | Refills: 3 | Status: SHIPPED | OUTPATIENT
Start: 2025-06-09

## 2025-06-09 RX ORDER — DEXMETHYLPHENIDATE HYDROCHLORIDE 10 MG/1
15 TABLET ORAL 2 TIMES DAILY
Qty: 90 TABLET | Refills: 0 | Status: SHIPPED | OUTPATIENT
Start: 2025-07-08 | End: 2025-08-07

## 2025-06-09 RX ORDER — DEXMETHYLPHENIDATE HYDROCHLORIDE 10 MG/1
15 TABLET ORAL 2 TIMES DAILY
Qty: 90 TABLET | Refills: 0 | Status: SHIPPED | OUTPATIENT
Start: 2025-08-08 | End: 2025-09-07

## 2025-06-09 ASSESSMENT — ENCOUNTER SYMPTOMS
FEVER: 0
LIGHT-HEADEDNESS: 0
WHEEZING: 0
SPEECH DIFFICULTY: 0
NUMBNESS: 0
AGITATION: 0
DIZZINESS: 0
VOMITING: 0
BRUISES/BLEEDS EASILY: 0
SINUS PRESSURE: 0
SEIZURES: 0
FREQUENCY: 0
SLEEP DISTURBANCE: 1
ADENOPATHY: 0
DECREASED CONCENTRATION: 1
COUGH: 0
FATIGUE: 0
EYE PAIN: 0
HEADACHES: 0
PHOTOPHOBIA: 0
NECK PAIN: 0
HALLUCINATIONS: 0
ARTHRALGIAS: 0
ABDOMINAL PAIN: 0
NERVOUS/ANXIOUS: 1
FACIAL ASYMMETRY: 0
PALPITATIONS: 0
DIFFICULTY URINATING: 0
HYPERACTIVE: 0
WEAKNESS: 0
JOINT SWELLING: 0
UNEXPECTED WEIGHT CHANGE: 1
SHORTNESS OF BREATH: 0
NECK STIFFNESS: 0
BACK PAIN: 0
CONFUSION: 0
NAUSEA: 0
TREMORS: 0
TROUBLE SWALLOWING: 0

## 2025-06-09 NOTE — PROGRESS NOTES
Edwin Soliman  24 y.o.       SUBJECTIVE  Edwin is a 24-year-old young man who was seen today for follow-up of his encephalopathy due to autism spectrum disorder with history of decreased attention span impulsive behavior anxiety and insomnia since last seen he is doing very well and mom is worried about his weight gain and has developed mild breast gynecomastia.  Today's physical and neurological examination is unchanged.  I would like to continue his Focalin but change it to 10 mg 1 and half tablet twice a day since his insurance is not covering the extended release and leave him only on Prozac and taper his risperidone.  In the meantime advised him to see a PCP or to see an endocrinologist and he may need a blood workup for metabolic syndrome.  Also discussed the dietary modification importance of exercise which he understood and see him back in 2 to 3 months    I have discussed the controlled substance policy, abuse potential, risk benefit and the precautions to be taken which he understood.      Due to technical limitations of voice recognition and human error, this note may not accurately reflect the care of the patient.    Review of Systems   Constitutional:  Positive for unexpected weight change. Negative for fatigue and fever.   HENT:  Negative for dental problem, ear pain, hearing loss, sinus pressure, tinnitus and trouble swallowing.    Eyes:  Negative for photophobia, pain and visual disturbance.   Respiratory:  Negative for cough, shortness of breath and wheezing.    Cardiovascular:  Negative for chest pain, palpitations and leg swelling.   Gastrointestinal:  Negative for abdominal pain, nausea and vomiting.   Genitourinary:  Negative for difficulty urinating, enuresis and frequency.   Musculoskeletal:  Negative for arthralgias, back pain, joint swelling, neck pain and neck stiffness.   Skin:  Negative for pallor and rash.   Allergic/Immunologic: Negative for food allergies.   Neurological:   "Negative for dizziness, tremors, seizures, syncope, facial asymmetry, speech difficulty, weakness, light-headedness, numbness and headaches.   Hematological:  Negative for adenopathy. Does not bruise/bleed easily.   Psychiatric/Behavioral:  Positive for decreased concentration and sleep disturbance. Negative for agitation, behavioral problems, confusion and hallucinations. The patient is nervous/anxious. The patient is not hyperactive.         Problem List[1]  Medical History[2]  Surgical History[3]    reports that he has never smoked. He has never used smokeless tobacco. He reports that he does not drink alcohol and does not use drugs.    /74 (BP Location: Left arm, Patient Position: Sitting, BP Cuff Size: Large adult)   Pulse 90   Ht 1.727 m (5' 8\")   Wt 107 kg (235 lb)   BMI 35.73 kg/m²     OBJECTIVE  Physical Exam/Neurological Exam   Constitutional: General appearance: no acute distress cognitive delayed and at times inappropriate and slow to respond  Auscultation of Heart: Regular rate and rhythm, no murmurs, normal S1 and S2.   Carotid Arteries: Intact without any bruits.   Neck is supple.   No lymph adenopathy.   Peripheral Vascular Exam: Pulses +2 and equal in all extremities. No swelling, varicosities, edema or tenderness to palpations.   Abdomen is soft, nondistended. No organomegaly.  Mental status: The patient was in no distress, alert, interactive and cooperative. Affect is inappropriate patient is cognitively delayed.  Orientation: oriented to person, oriented to place and oriented to time.   Memory: recent memory intact and remote memory intact.   Attention: normal attention span and normal concentrating ability.   Language: normal comprehension and no difficulty naming common objects.   Fund of knowledge: Patient displays inadequate knowledge of current events, inadequate fund of knowledge regarding past history and inadequate fund of knowledge regarding vocabulary.   Eyes: The " ophthalmoscopic examination was normal. The fundi are visualized with normal disc margins and without.  Cranial nerve II: Visual fields full to confrontation.   Cranial nerves III, IV, and VI: Pupils round, equally reactive to light; no ptosis. EOMs intact. No nystagmus.   Cranial Nerve V: Facial sensation intact bilaterally.   Cranial nerve VII: Normal and symmetric facial strength.   Cranial nerve VIII: Hearing is intact bilaterally to finger rub / whisper.   Cranial nerves IX and X: Palate elevates symmetrically.   Cranial nerve XI: Shoulder shrug and neck rotation strength are intact.   Cranial nerve XII: Tongue midline with normal strength.   Motor: Motor exam was normal. Muscle bulk was normal in both upper and lower extremities. Muscle tone was normal in both upper and lower extremities. Muscle strength was 5/5 throughout. no abnormal or adventitious movements were present.   Deep Tendon Reflexes: left biceps 2+ , right biceps 2+, left triceps 2+, right triceps 2+, left brachioradialis 2+, right brachioradialis 2+, left patella 2+, right patella 2+, left ankle jerk 2+, right ankle jerk 2+   Plantar Reflex: Toes downgoing to plantar stimulation on the left. Toes downgoing to plantar stimulation on the right.   Sensory Exam: Normal to light touch.   Coordination: There is no limb dystaxia and rapid alternating movements are intact.  Gait: Gait is normal without spasticity.  He was ambulating without any difficulty      ASSESSMENT/PLAN  Diagnoses and all orders for this visit:  Encephalopathy, unspecified type  Attention deficit hyperactivity disorder (ADHD), combined type  -     dexmethylphenidate (Focalin) 10 mg tablet; Take 1.5 tablets (15 mg) by mouth 2 times a day.  -     dexmethylphenidate (Focalin) 10 mg tablet; Take 1.5 tablets (15 mg) by mouth 2 times a day. Do not fill before July 8, 2025.  -     dexmethylphenidate (Focalin) 10 mg tablet; Take 1.5 tablets (15 mg) by mouth 2 times a day. Do not fill  before August 8, 2025.  Anxiety  -     FLUoxetine (PROzac) 40 mg capsule; Take 1 capsule (40 mg) by mouth once daily.  Autistic disorder, active ([facility])  Impulse control disorder        Markel Littlejohn MD  6/9/2025  12:17 PM       [1]   Patient Active Problem List  Diagnosis    ADHD (attention deficit hyperactivity disorder), combined type    Anxiety    Autistic disorder, active ([facility])    Encephalopathy    Impulse disorder    PDA (patent ductus arteriosus) ([facility])    Attention deficit hyperactivity disorder (ADHD)    History of mental disorder    Impulsiveness    Attention deficit hyperactivity disorder (ADHD), combined type    Disorder of brain    Impulse control disorder   [2]   Past Medical History:  Diagnosis Date    Personal history of other mental and behavioral disorders     History of attention deficit hyperactivity disorder (ADHD)   [3]   Past Surgical History:  Procedure Laterality Date    CARDIAC SURGERY  04/20/2017    Heart Surgery

## 2025-09-02 ENCOUNTER — APPOINTMENT (OUTPATIENT)
Dept: NEUROLOGY | Facility: CLINIC | Age: 25
End: 2025-09-02
Payer: MEDICARE

## 2025-09-15 ENCOUNTER — APPOINTMENT (OUTPATIENT)
Dept: NEUROLOGY | Facility: CLINIC | Age: 25
End: 2025-09-15
Payer: MEDICARE